# Patient Record
Sex: MALE | Race: WHITE | Employment: FULL TIME | URBAN - METROPOLITAN AREA
[De-identification: names, ages, dates, MRNs, and addresses within clinical notes are randomized per-mention and may not be internally consistent; named-entity substitution may affect disease eponyms.]

---

## 2019-12-13 ENCOUNTER — HOSPITAL ENCOUNTER (OUTPATIENT)
Dept: OCCUPATIONAL THERAPY | Age: 64
Setting detail: THERAPIES SERIES
Discharge: HOME | End: 2019-12-13
Payer: MEDICARE

## 2019-12-13 PROCEDURE — 97165 OT EVAL LOW COMPLEX 30 MIN: CPT | Performed by: OCCUPATIONAL THERAPIST

## 2019-12-13 PROCEDURE — 97110 THERAPEUTIC EXERCISES: CPT | Performed by: OCCUPATIONAL THERAPIST

## 2019-12-13 PROCEDURE — 97018 PARAFFIN BATH THERAPY: CPT | Performed by: OCCUPATIONAL THERAPIST

## 2019-12-17 ENCOUNTER — HOSPITAL ENCOUNTER (OUTPATIENT)
Dept: OCCUPATIONAL THERAPY | Age: 64
Setting detail: THERAPIES SERIES
Discharge: HOME | End: 2019-12-17
Payer: MEDICARE

## 2019-12-17 PROCEDURE — 97110 THERAPEUTIC EXERCISES: CPT | Performed by: OCCUPATIONAL THERAPIST

## 2019-12-17 PROCEDURE — 97140 MANUAL THERAPY 1/> REGIONS: CPT | Performed by: OCCUPATIONAL THERAPIST

## 2019-12-17 PROCEDURE — 97530 THERAPEUTIC ACTIVITIES: CPT | Performed by: OCCUPATIONAL THERAPIST

## 2019-12-19 ENCOUNTER — HOSPITAL ENCOUNTER (OUTPATIENT)
Dept: OCCUPATIONAL THERAPY | Age: 64
Setting detail: THERAPIES SERIES
Discharge: HOME | End: 2019-12-19
Payer: MEDICARE

## 2019-12-19 PROCEDURE — 97530 THERAPEUTIC ACTIVITIES: CPT | Performed by: OCCUPATIONAL THERAPIST

## 2019-12-19 PROCEDURE — 97140 MANUAL THERAPY 1/> REGIONS: CPT | Performed by: OCCUPATIONAL THERAPIST

## 2019-12-19 PROCEDURE — 97110 THERAPEUTIC EXERCISES: CPT | Performed by: OCCUPATIONAL THERAPIST

## 2019-12-19 PROCEDURE — L3925 FO PIP DIP JNT/SPRNG PRE OTS: HCPCS | Performed by: OCCUPATIONAL THERAPIST

## 2019-12-23 ENCOUNTER — HOSPITAL ENCOUNTER (OUTPATIENT)
Dept: OCCUPATIONAL THERAPY | Age: 64
Setting detail: THERAPIES SERIES
Discharge: HOME | End: 2019-12-23
Payer: MEDICARE

## 2019-12-23 PROCEDURE — 97530 THERAPEUTIC ACTIVITIES: CPT | Performed by: OCCUPATIONAL THERAPIST

## 2019-12-23 PROCEDURE — 97018 PARAFFIN BATH THERAPY: CPT | Performed by: OCCUPATIONAL THERAPIST

## 2019-12-23 PROCEDURE — 97110 THERAPEUTIC EXERCISES: CPT | Performed by: OCCUPATIONAL THERAPIST

## 2019-12-23 PROCEDURE — 97140 MANUAL THERAPY 1/> REGIONS: CPT | Performed by: OCCUPATIONAL THERAPIST

## 2019-12-31 ENCOUNTER — HOSPITAL ENCOUNTER (OUTPATIENT)
Dept: OCCUPATIONAL THERAPY | Age: 64
Setting detail: THERAPIES SERIES
Discharge: HOME | End: 2019-12-31
Payer: MEDICARE

## 2019-12-31 PROCEDURE — 97110 THERAPEUTIC EXERCISES: CPT | Performed by: OCCUPATIONAL THERAPIST

## 2019-12-31 PROCEDURE — G0283 ELEC STIM OTHER THAN WOUND: HCPCS | Performed by: OCCUPATIONAL THERAPIST

## 2019-12-31 PROCEDURE — 97140 MANUAL THERAPY 1/> REGIONS: CPT | Performed by: OCCUPATIONAL THERAPIST

## 2019-12-31 PROCEDURE — 97022 WHIRLPOOL THERAPY: CPT | Performed by: OCCUPATIONAL THERAPIST

## 2019-12-31 NOTE — FLOWSHEET NOTE
self care, reaching, carrying, lifting, house/yardwork, driving/computer work.     Therapeutic Activities & NMR:    [] (74643 or 49780) Provided verbal/tactile cueing for activities related to improving balance, coordination, kinesthetic sense, posture, motor skill, proprioception and motor activation to allow for proper function of scapular, scapulothoracic and UE control with self care, carrying, lifting, driving/computer work    Home Exercise Program:    [x] (77260) Reviewed/Progressed HEP activities related to strengthening, flexibility, endurance, ROM of scapular, scapulothoracic and UE control with self care, reaching, carrying, lifting, house/yardwork, driving/computer work  [] (67811) Reviewed/Progressed HEP activities related to improving balance, coordination, kinesthetic sense, posture, motor skill, proprioception of scapular, scapulothoracic and UE control with self care, reaching, carrying, lifting, house/yardwork, driving/computer work      Manual Treatments:  PROM / STM / Oscillations-Mobs:  G-I, II, III, IV (PA's, Inf., Post.)  [x] (67614) Provided manual therapy to mobilize soft tissue/joints of cervical/CT, scapular GHJ and UE for the purpose of modulating pain, promoting relaxation,  increasing ROM, reducing/eliminating soft tissue swelling/inflammation/restriction, improving soft tissue extensibility and allowing for proper ROM for normal function with self care, reaching, carrying, lifting, house/yardwork, driving/computer work    ADL Training:  [] (07741) Provided self-care/home management training related to activities of daily living and compensatory training, and/or use of adaptive equipment        Charges:  Timed Code Treatment Minutes: 35   Total Treatment Minutes: 58        [] EVAL (LOW) 79983 (typically 20 minutes face-to-face)    [] EVAL (MOD) 33229 (typically 30 minutes face-to-face)  [] EVAL (HIGH) 39164 (typically 45 minutes face-to-face)  [] OT Re-eval (83882)       [x] Rickey [phone] x

## 2020-01-07 ENCOUNTER — HOSPITAL ENCOUNTER (OUTPATIENT)
Dept: OCCUPATIONAL THERAPY | Age: 65
Setting detail: THERAPIES SERIES
Discharge: HOME | End: 2020-01-07
Payer: MEDICARE

## 2020-01-07 PROCEDURE — 97022 WHIRLPOOL THERAPY: CPT | Performed by: OCCUPATIONAL THERAPIST

## 2020-01-07 PROCEDURE — 97110 THERAPEUTIC EXERCISES: CPT | Performed by: OCCUPATIONAL THERAPIST

## 2020-01-07 PROCEDURE — 97140 MANUAL THERAPY 1/> REGIONS: CPT | Performed by: OCCUPATIONAL THERAPIST

## 2020-01-07 NOTE — FLOWSHEET NOTE
[facility] [employer]  [facility] [place] [address]  Phone [phone]      Fax [phone]    Occupational Therapy Treatment Note/ Progress Report:     Date:  2020    Patient Name:  Conner Hernandez    :  1955  MRN: [id number]    Medical/Treatment Diagnosis Information:  · Diagnosis: L small Dupuytrens   · Assessment: L hand stiffness  C90.870     Insurance/Certification information:  OT Insurance Information: Medicare/med [place]  Physician Information:  Referring Practitioner: Lior Ren  Has the plan of care been signed (Y/N):        []  Yes  [x]  No       Visit # Insurance Allowable Auth Required   6 Med necessity []  Yes []  No        Is this a Progress Report:     []  Yes  [x]  No      If Yes:  Date Range for reporting period:  Beginning  Ending    Progress report will be due (10 Rx or 30 days whichever is less):     Recertification will be due (POC Duration  / 90 days whichever is less): 20    Date of Injury: NA  Date of Surgery: xiaflex L small 10/25/19 and manipulation 10/28/19    Date of Patient follow up with Physician:     RESTRICTIONS/PRECAUTIONS:     Latex Allergy:  [x]No      []Yes  Pacemaker:  [x] No       [] Yes     Preferred Language for Healthcare:   [x]English       []other:     Functional Scale:   66%  ( Initial 48% )(Quick DASH)   Date assessed:  2019      SUBJECTIVE:   2020 reports he saw Dr. Radha Valdes who told him he had a lot of inflammation. Edema glove recommended ,however he already has one  19  Still unable to make a fist; feels like it it inflammed . \" I think I am going backwards\"  Reports compliance with flex loop. 19  Still stiff and unable to touch palm by end of week.  Reports k tape assisted in resolving ulnar nerve issues  19  States splint feels good at night  Patient reported deficits/history of current problem:   Had manipulation, then went indiv finger flex,  indiv finger ext , abd Yellow  , roll , pinch , indiv finger flex,  indiv finger ext , abd Yellow putty  indiv finger flex  indiv finger ext    and pull         foam cubes placed at base of palm using finger flex     Rubberband exer   finger flex, finger ext, abd              Manual Therapy (65831) Palmar massage K tape for ulnar nerve Place and hold finger ext and IP flex Place and hold finger ext and IP flex    Edema glove issued . Pt felt he could move better with glove in place Place and hold finger ext and IP & composite flex  10X ea    Discussed need to be more consistent with flex loop and to perform exercises following it's usage     Place and hold finger ext and IP flex Gel pad issued for pt to use at night with ext splint to assist with scar remodeling. Pt instructed regarding application , usage , and care   Pt reports gel pad stuck to itself and he could not undo it. Pt to bring in splint and elastomer insert will be fabricated Did not bring in splint today. Neuromuscular Reeducation (79013) Educated pt regarding the ulnar nerve and preventative measures to prevent worsening of symptoms                       ADL Training (26337)                        HEP Training/Review See sheet(s)                       Splinting Pt to bring in splint next visit LMB ext splint  Size B    Flex loop Adjusted PIP ext splint to cont gradually increasing L SF ext      Lcode:        Orthotic Mgmt, Subsequent Enc (82100)        Orthotic Mgmt & Training (08387)                Other:                                          Therapeutic Exercise & NMR:  [x] (65292) Provided verbal/tactile cueing for activities related to strengthening, flexibility, endurance, ROM  for improvements in scapular, scapulothoracic and UE control with self care, reaching, carrying, lifting, house/yardwork, driving/computer work.     [x] (50120) Provided verbal/tactile cueing for activities related to improving balance, coordination, kinesthetic sense, posture, motor skill, proprioception  to assist with  scapular, scapulothoracic and UE control with self care, reaching, carrying, lifting, house/yardwork, driving/computer work.     Therapeutic Activities & NMR:    [] (03835 or 84175) Provided verbal/tactile cueing for activities related to improving balance, coordination, kinesthetic sense, posture, motor skill, proprioception and motor activation to allow for proper function of scapular, scapulothoracic and UE control with self care, carrying, lifting, driving/computer work    Home Exercise Program:    [x] (45750) Reviewed/Progressed HEP activities related to strengthening, flexibility, endurance, ROM of scapular, scapulothoracic and UE control with self care, reaching, carrying, lifting, house/yardwork, driving/computer work  [] (05288) Reviewed/Progressed HEP activities related to improving balance, coordination, kinesthetic sense, posture, motor skill, proprioception of scapular, scapulothoracic and UE control with self care, reaching, carrying, lifting, house/yardwork, driving/computer work      Manual Treatments:  PROM / STM / Oscillations-Mobs:  G-I, II, III, IV (PA's, Inf., Post.)  [x] (76854) Provided manual therapy to mobilize soft tissue/joints of cervical/CT, scapular GHJ and UE for the purpose of modulating pain, promoting relaxation,  increasing ROM, reducing/eliminating soft tissue swelling/inflammation/restriction, improving soft tissue extensibility and allowing for proper ROM for normal function with self care, reaching, carrying, lifting, house/yardwork, driving/computer work    ADL Training:  [] (49503) Provided self-care/home management training related to activities of daily living and compensatory training, and/or use of adaptive equipment        Charges:  Timed Code Treatment Minutes: 45   Total Treatment Minutes: 59        [] GEOVANNA (LOW) 80571 (typically 20 minutes face-to-face)    [] GEOVANNA Hodan Wilkinson  (RN)  2018 12:24:28

## 2020-01-09 ENCOUNTER — HOSPITAL ENCOUNTER (OUTPATIENT)
Dept: OCCUPATIONAL THERAPY | Age: 65
Setting detail: THERAPIES SERIES
Discharge: HOME | End: 2020-01-09
Payer: MEDICARE

## 2020-01-09 PROCEDURE — 97110 THERAPEUTIC EXERCISES: CPT | Performed by: OCCUPATIONAL THERAPIST

## 2020-01-09 PROCEDURE — 97022 WHIRLPOOL THERAPY: CPT | Performed by: OCCUPATIONAL THERAPIST

## 2020-01-09 PROCEDURE — 97140 MANUAL THERAPY 1/> REGIONS: CPT | Performed by: OCCUPATIONAL THERAPIST

## 2020-01-09 NOTE — FLOWSHEET NOTE
[facility] [employer]  [facility] [place] [address]  Phone [phone]      Fax [phone]    Occupational Therapy Treatment Note/ Progress Report:     Date:  2020    Patient Name:  Donovan Woodruff    :  1955  MRN: [id number]    Medical/Treatment Diagnosis Information:  · Diagnosis: L small Dupuytrens   · Assessment: L hand stiffness  D64.351     Insurance/Certification information:  OT Insurance Information: Medicare/med [place]  Physician Information:  Referring Practitioner: Jay Jay Argueta  Has the plan of care been signed (Y/N):        []  Yes  [x]  No       Visit # Insurance Allowable Auth Required   7 Med necessity []  Yes []  No        Is this a Progress Report:     []  Yes  [x]  No      If Yes:  Date Range for reporting period:  Beginning  Ending    Progress report will be due (10 Rx or 30 days whichever is less): 74    Recertification will be due (POC Duration  / 90 days whichever is less): 20    Date of Injury: NA  Date of Surgery: xiaflex L small 10/25/19 and manipulation 10/28/19    Date of Patient follow up with Physician:     RESTRICTIONS/PRECAUTIONS:     Latex Allergy:  [x]No      []Yes  Pacemaker:  [x] No       [] Yes     Preferred Language for Healthcare:   [x]English       []other:     Functional Scale:   66%  ( Initial 48% )(Quick DASH)   Date assessed:  2019      SUBJECTIVE:    20  Reports he feels he is getting better flex  2020 reports he saw Dr. Adam Diaz who told him he had a lot of inflammation. Edema glove recommended ,however he already has one  19  Still unable to make a fist; feels like it it inflammed . \" I think I am going backwards\"  Reports compliance with flex loop. 19  Still stiff and unable to touch palm by end of week.  Reports k tape assisted in resolving ulnar nerve issues  19  States splint feels good at night  Patient reported gradually increasing L SF ext       Lcode:         Orthotic Mgmt, Subsequent Enc (56930)         Orthotic Mgmt & Training (88133)                  Other:                                               Therapeutic Exercise & NMR:  [x] (35164) Provided verbal/tactile cueing for activities related to strengthening, flexibility, endurance, ROM  for improvements in scapular, scapulothoracic and UE control with self care, reaching, carrying, lifting, house/yardwork, driving/computer work. [x] (79927) Provided verbal/tactile cueing for activities related to improving balance, coordination, kinesthetic sense, posture, motor skill, proprioception  to assist with  scapular, scapulothoracic and UE control with self care, reaching, carrying, lifting, house/yardwork, driving/computer work.     Therapeutic Activities & NMR:    [] (17407 or 62875) Provided verbal/tactile cueing for activities related to improving balance, coordination, kinesthetic sense, posture, motor skill, proprioception and motor activation to allow for proper function of scapular, scapulothoracic and UE control with self care, carrying, lifting, driving/computer work    Home Exercise Program:    [x] (54183) Reviewed/Progressed HEP activities related to strengthening, flexibility, endurance, ROM of scapular, scapulothoracic and UE control with self care, reaching, carrying, lifting, house/yardwork, driving/computer work  [] (42912) Reviewed/Progressed HEP activities related to improving balance, coordination, kinesthetic sense, posture, motor skill, proprioception of scapular, scapulothoracic and UE control with self care, reaching, carrying, lifting, house/yardwork, driving/computer work      Manual Treatments:  PROM / STM / Oscillations-Mobs:  G-I, II, III, IV (PA's, Inf., Post.)  [x] (42358) Provided manual therapy to mobilize soft tissue/joints of cervical/CT, scapular GHJ and UE for the purpose of modulating pain, promoting relaxation,  increasing ROM, reducing/eliminating soft tissue swelling/inflammation/restriction, improving soft tissue extensibility and allowing for proper ROM for normal function with self care, reaching, carrying, lifting, house/yardwork, driving/computer work    ADL Training:  [] (93406) Provided self-care/home management training related to activities of daily living and compensatory training, and/or use of adaptive equipment        Charges:  Timed Code Treatment Minutes: 46   Total Treatment Minutes: 60        [] EVAL (LOW) 02389 (typically 20 minutes face-to-face)    [] EVAL (MOD) 39428 (typically 30 minutes face-to-face)  [] EVAL (HIGH) 30873 (typically 45 minutes face-to-face)  [] OT Re-eval (03605)       [x] Rickey [phone]) x  2    [] MPRPX(77902)  [] NMR (39623) x      [] Estim (attended) (40142)   [x] Manual ([id number]) x   1   [] US (92550)  [] TA (74894) x   1   [] Paraffin (38474)  [] ADL  (26103) x     [] Splint/L code:    [] Estim (unattended) (22325)  [x] Fluidotherapy (94151)  [] PKRMI:  LMB & flex loop      ASSESSMENT: Good progress with  strength. Finger flex remains limited but pt feels that is because he is attending therapy earlier in the day. He plans on scheduling later in the day so will cont to monitor and not any difference due to time of day. Frequency/Duration:  1-2 days per week for 4-6 weeks (1/24/20)        GOALS:  Patient stated goal: To be able to lift objects, use tools, and golf   [x]? Progressing: []? Met: []? Not Met: []? Adjusted     Therapist goals for Patient:   Short Term Goals: To be achieved in: 2 weeks  1. Independent in HEP and progression per patient tolerance, in order to prevent re-injury. [x]? Progressing: []? Met: []? Not Met: []? Adjusted   2. Patient will have a decrease in pain to facilitate improvement in movement, function, and ADLs as indicated by Functional Deficits. [x]? Progressing: []? Met: []? Not Met: []?  Adjusted     Long Term Goals to be achieved in 4-6 weeks (through 1/24/20), including patient directed goals to address patient identified performance deficits:  1) Pt to be independent in graded HEP progression with a good level of effort and compliance. [x]? Progressing: []? Met: []? Not Met: []? Adjusted   2) Pt to report a score of </= 25 % on the Quick DASH disability questionnaire for increased performance with carrying, moving, and handling objects. []? Progressing: []? Met: [x]? Not Met: []? Adjusted   3) Pt will demonstrate increased ROM to fingertips to DPFC of L digits </=1 cm and L Small MP and PIP ext </= 15-20 degrees for improved independence with gripping steering wheel,.cutting food, typing, and performing tasks requiring a flat hand  [x]? Progressing: []? Met: []? Not Met: []? Adjusted   4) Pt will demonstrate increased strength to L  within 25# of R  for improved independence with lifting using tools, cooking,cleaning and golf  [x]? Progressing: []? Met: []? Not Met: []? Adjusted   5) Pt will have a decrease in pain to 2/10 to facilitate with gripping and dressing tasks. [x]? Progressing: []? Met: []? Not Met: []? Adjusted         Overall Progression Towards Functional Goals/Treatment Progress Update:  [x] Patient is progressing as expected towards functional goals listed. [] Progression is slowed due to complexities/impairments listed. [] Progression has been slowed due to co-morbidities.   [] Plan just implemented, too soon to assess goals progression <30 days  [] Goals require adjustment due to lack of progress  [] Patient is not progressing as expected and requires additional follow up with physician  [] All goals are met  [] Other:     Prognosis for POC: [x] Good [] Fair  [] Poor    Patient requires continued skilled intervention: [x] Yes  [] No    Treatment/Activity Tolerance:  [x] Patient able to complete treatment  [] Patient limited by fatigue  [] Patient limited by pain    [] Patient limited by other medical complications  [] Other:

## 2020-01-14 ENCOUNTER — HOSPITAL ENCOUNTER (OUTPATIENT)
Dept: OCCUPATIONAL THERAPY | Age: 65
Setting detail: THERAPIES SERIES
Discharge: HOME | End: 2020-01-14
Payer: MEDICARE

## 2020-01-14 PROCEDURE — 97140 MANUAL THERAPY 1/> REGIONS: CPT | Performed by: OCCUPATIONAL THERAPIST

## 2020-01-14 PROCEDURE — 97112 NEUROMUSCULAR REEDUCATION: CPT | Performed by: OCCUPATIONAL THERAPIST

## 2020-01-14 PROCEDURE — 97110 THERAPEUTIC EXERCISES: CPT | Performed by: OCCUPATIONAL THERAPIST

## 2020-01-14 PROCEDURE — 97022 WHIRLPOOL THERAPY: CPT | Performed by: OCCUPATIONAL THERAPIST

## 2020-01-14 NOTE — FLOWSHEET NOTE
gripping I/2 inch dowel Press into putty gripping 1./2 inch dowel     Yellow  , roll , pinch , indiv finger flex,  indiv finger ext , abd Yellow putty  indiv finger flex  indiv finger ext    and pull Yellow putty  indiv finger flex   indiv finger ext   5x ea   and pull  15X yellow putty    and pull  indiv finger flex   indiv finger ext   5x ea         foam cubes placed at base of palm using finger flex Finger ext over foam cube  RF, SF  10xea Med n glides  5x 15 sec hold                   Manual Therapy (89681) Palmar massage Place and hold finger ext and IP flex    Edema glove issued . Pt felt he could move better with glove in place Place and hold finger ext and IP & composite flex  10X ea    Discussed need to be more consistent with flex loop and to perform exercises following it's usage Place and hold finger ext and IP & composite flex  10X ea Place and hold   IP & composite flex  10xea       Pt reports gel pad stuck to itself and he could not undo it. Pt to bring in splint and elastomer insert will be fabricated Did not bring in splint today.              Neuromuscular Reeducation (25014) Educated pt regarding the ulnar nerve and preventative measures to prevent worsening of symptoms    Med n glides  5X                   ADL Training (81925)                        HEP Training/Review See sheet(s)                       Splinting Pt to bring in splint next visit    elastomer insert fabricated and pt to to wear with splint at night to assist with remodeling   Lcode:        Orthotic Mgmt, Subsequent Enc (16174)        Orthotic Mgmt & Training (35978)                Other:                                          Therapeutic Exercise & NMR:  [x] (73694) Provided verbal/tactile cueing for activities related to strengthening, flexibility, endurance, ROM  for improvements in scapular, scapulothoracic and UE control with self care, reaching, carrying, lifting, house/yardwork, driving/computer work.    [x] (13240) Provided verbal/tactile cueing for activities related to improving balance, coordination, kinesthetic sense, posture, motor skill, proprioception  to assist with  scapular, scapulothoracic and UE control with self care, reaching, carrying, lifting, house/yardwork, driving/computer work.     Therapeutic Activities & NMR:    [] (97215 or 41385) Provided verbal/tactile cueing for activities related to improving balance, coordination, kinesthetic sense, posture, motor skill, proprioception and motor activation to allow for proper function of scapular, scapulothoracic and UE control with self care, carrying, lifting, driving/computer work    Home Exercise Program:    [x] (97856) Reviewed/Progressed HEP activities related to strengthening, flexibility, endurance, ROM of scapular, scapulothoracic and UE control with self care, reaching, carrying, lifting, house/yardwork, driving/computer work  [] (53700) Reviewed/Progressed HEP activities related to improving balance, coordination, kinesthetic sense, posture, motor skill, proprioception of scapular, scapulothoracic and UE control with self care, reaching, carrying, lifting, house/yardwork, driving/computer work      Manual Treatments:  PROM / STM / Oscillations-Mobs:  G-I, II, III, IV (PA's, Inf., Post.)  [x] (98972) Provided manual therapy to mobilize soft tissue/joints of cervical/CT, scapular GHJ and UE for the purpose of modulating pain, promoting relaxation,  increasing ROM, reducing/eliminating soft tissue swelling/inflammation/restriction, improving soft tissue extensibility and allowing for proper ROM for normal function with self care, reaching, carrying, lifting, house/yardwork, driving/computer work    ADL Training:  [] (66788) Provided self-care/home management training related to activities of daily living and compensatory training, and/or use of adaptive equipment        Charges:  Timed Code Treatment Minutes: 50   Total Treatment Minutes:

## 2020-01-16 ENCOUNTER — HOSPITAL ENCOUNTER (OUTPATIENT)
Dept: OCCUPATIONAL THERAPY | Age: 65
Setting detail: THERAPIES SERIES
Discharge: HOME | End: 2020-01-16
Payer: MEDICARE

## 2020-01-16 PROCEDURE — 97110 THERAPEUTIC EXERCISES: CPT | Performed by: OCCUPATIONAL THERAPIST

## 2020-01-16 PROCEDURE — 97140 MANUAL THERAPY 1/> REGIONS: CPT | Performed by: OCCUPATIONAL THERAPIST

## 2020-01-16 PROCEDURE — 97022 WHIRLPOOL THERAPY: CPT | Performed by: OCCUPATIONAL THERAPIST

## 2020-01-16 PROCEDURE — 97112 NEUROMUSCULAR REEDUCATION: CPT | Performed by: OCCUPATIONAL THERAPIST

## 2020-01-16 NOTE — FLOWSHEET NOTE
[facility], [facility] [address]  Phone [phone]      Fax [phone]    Occupational Therapy Treatment Note/ Progress Report:     Date:  2020    Patient Name:  Hernandez Velázquez    :  1955  MRN: [id number]    Medical/Treatment Diagnosis Information:  · Diagnosis: L small Dupuytrens   · Assessment: L hand stiffness  H53.134     Insurance/Certification information:  OT Insurance Information: Medicare/med [place]  Physician Information:  Referring Practitioner: Rona Gerber  Has the plan of care been signed (Y/N):        []  Yes  [x]  No       Visit # Insurance Allowable Auth Required   9 Med necessity []  Yes []  No        Is this a Progress Report:     []  Yes  [x]  No      If Yes:  Date Range for reporting period:  Beginning  Ending    Progress report will be due (10 Rx or 30 days whichever is less): 2020       (Notes )    Recertification will be due (POC Duration  / 90 days whichever is less): 20    Date of Injury: NA  Date of Surgery: xiaflex L small 10/25/19 and manipulation 10/28/19    Date of Patient follow up with Physician:     RESTRICTIONS/PRECAUTIONS:     Latex Allergy:  [x]No      []Yes  Pacemaker:  [x] No       [] Yes     Preferred Language for Healthcare:   [x]English       []other:     Functional Scale:   66%  ( Initial 48% )(Quick DASH)   Date assessed:  2019      SUBJECTIVE:  2020 Feels he is  Doing better and is turning corner . Reports night splint is working well . 2020 Still having difficulty bending. 20  Reports he feels he is getting better flex  2020 reports he saw Dr. Segundo Stearns who told him he had a lot of inflammation. Edema glove recommended ,however he already has one  19  Still unable to make a fist; feels like it it inflammed . \" I think I am going backwards\"  Reports compliance with flex loop.   19  Still stiff and AAROM IP flex / hook  1/2 inch dowel  10X  Hook to fist  10x                 powergrip   50 #  20 powergrip   50 #  20        digiflex red  10 x 5 sets ea finger digiflex red ring and small  Gr index and long  10 x 5 sets ea finger         Ext device with med band  15 x2   Therapeutic Activity (55823)  Yellow putty flatten with mallet Press into towel gripping I/2 inch dowel Press into yellow putyy gripping I/2 inch dowel Press into putty gripping 1./2 inch dowel      Yellow  , roll , pinch , indiv finger flex,  indiv finger ext , abd Yellow putty  indiv finger flex  indiv finger ext    and pull Yellow putty  indiv finger flex   indiv finger ext   5x ea   and pull  15X yellow putty    and pull  indiv finger flex   indiv finger ext   5x ea   yellow putty    and pull    Red putty  indiv finger flex   indiv finger ext        foam cubes placed at base of palm using finger flex Finger ext over foam cube  RF, SF  10xea                       Manual Therapy (04885) Palmar massage Place and hold finger ext and IP flex    Edema glove issued . Pt felt he could move better with glove in place Place and hold finger ext and IP & composite flex  10X ea    Discussed need to be more consistent with flex loop and to perform exercises following it's usage Place and hold finger ext and IP & composite flex  10X ea Place and hold   IP & composite flex  10xea Place and hold   IP & composite flex  12xea       Pt reports gel pad stuck to itself and he could not undo it. Pt to bring in splint and elastomer insert will be fabricated Did not bring in splint today.                Neuromuscular Reeducation (78011) Educated pt regarding the ulnar nerve and preventative measures to prevent worsening of symptoms    Med n glides  5X Med n glides  5X                     ADL Training (12330)                           HEP Training/Review See sheet(s)                          Splinting Pt to bring in splint next visit elastomer insert fabricated and pt to to wear with splint at night to assist with remodeling    Lcode:         Orthotic Mgmt, Subsequent Enc (22133)         Orthotic Mgmt & Training (15996)                  Other:                                               Therapeutic Exercise & NMR:  [x] (40508) Provided verbal/tactile cueing for activities related to strengthening, flexibility, endurance, ROM  for improvements in scapular, scapulothoracic and UE control with self care, reaching, carrying, lifting, house/yardwork, driving/computer work. [x] (13618) Provided verbal/tactile cueing for activities related to improving balance, coordination, kinesthetic sense, posture, motor skill, proprioception  to assist with  scapular, scapulothoracic and UE control with self care, reaching, carrying, lifting, house/yardwork, driving/computer work.     Therapeutic Activities & NMR:    [] (03006 or 42752) Provided verbal/tactile cueing for activities related to improving balance, coordination, kinesthetic sense, posture, motor skill, proprioception and motor activation to allow for proper function of scapular, scapulothoracic and UE control with self care, carrying, lifting, driving/computer work    Home Exercise Program:    [x] (59049) Reviewed/Progressed HEP activities related to strengthening, flexibility, endurance, ROM of scapular, scapulothoracic and UE control with self care, reaching, carrying, lifting, house/yardwork, driving/computer work  [] (88695) Reviewed/Progressed HEP activities related to improving balance, coordination, kinesthetic sense, posture, motor skill, proprioception of scapular, scapulothoracic and UE control with self care, reaching, carrying, lifting, house/yardwork, driving/computer work      Manual Treatments:  PROM / STM / Oscillations-Mobs:  G-I, II, III, IV (PA's, Inf., Post.)  [x] (29856) Provided manual therapy to mobilize soft tissue/joints of cervical/CT, scapular GHJ and UE for the purpose of modulating pain, promoting relaxation,  increasing ROM, reducing/eliminating soft tissue swelling/inflammation/restriction, improving soft tissue extensibility and allowing for proper ROM for normal function with self care, reaching, carrying, lifting, house/yardwork, driving/computer work    ADL Training:  [] (32246) Provided self-care/home management training related to activities of daily living and compensatory training, and/or use of adaptive equipment        Charges:  Timed Code Treatment Minutes: 48   Total Treatment Minutes: 60        [] EVAL (LOW) 90834 (typically 20 minutes face-to-face)    [] EVAL (MOD) 34458 (typically 30 minutes face-to-face)  [] EVAL (HIGH) 04499 (typically 45 minutes face-to-face)  [] OT Re-eval (99431)       [x] Rickey [phone]) x  1    [] BDVZM(77158)  [x] NMR (26488) x  1    [] Estim (attended) (11818)   [x] Manual ([id number]) x   1   [] US (20214)  [] TA (55955) x      [] Paraffin (11615)  [] ADL  (83803) x     [] Splint/L code:    [] Estim (unattended) ([phone])  [x] Fluidotherapy (62361)  [] ZVFXO:  LMB & flex loop      ASSESSMENT:  Med nerve glides have helped with tingling per pt report. Able to touch palm with index and long by end of session. Pt very positive today and feels he has \" turned the corner\". Frequency/Duration:  1-2 days per week for 4-6 weeks (1/24/20)        GOALS:  Patient stated goal: To be able to lift objects, use tools, and golf   [x]? Progressing: []? Met: []? Not Met: []? Adjusted     Therapist goals for Patient:   Short Term Goals: To be achieved in: 2 weeks  1. Independent in HEP and progression per patient tolerance, in order to prevent re-injury. [x]? Progressing: []? Met: []? Not Met: []? Adjusted   2. Patient will have a decrease in pain to facilitate improvement in movement, function, and ADLs as indicated by Functional Deficits. [x]? Progressing: []? Met: []? Not Met: []?  Adjusted     Long Term Goals to be achieved in 4-6 weeks (through 1/24/20), including patient directed goals to address patient identified performance deficits:  1) Pt to be independent in graded HEP progression with a good level of effort and compliance. [x]? Progressing: []? Met: []? Not Met: []? Adjusted   2) Pt to report a score of </= 25 % on the Quick DASH disability questionnaire for increased performance with carrying, moving, and handling objects. []? Progressing: []? Met: [x]? Not Met: []? Adjusted   3) Pt will demonstrate increased ROM to fingertips to DPFC of L digits </=1 cm and L Small MP and PIP ext </= 15-20 degrees for improved independence with gripping steering wheel,.cutting food, typing, and performing tasks requiring a flat hand  [x]? Progressing: []? Met: [x]? Not Met: []? Adjusted   4) Pt will demonstrate increased strength to L  within 25# of R  for improved independence with lifting using tools, cooking,cleaning and golf  [x]? Progressing: []? Met: []? Not Met: []? Adjusted   5) Pt will have a decrease in pain to 2/10 to facilitate with gripping and dressing tasks. [x]? Progressing: []? Met: []? Not Met: []? Adjusted         Overall Progression Towards Functional Goals/Treatment Progress Update:  [x] Patient is progressing as expected towards functional goals listed. [] Progression is slowed due to complexities/impairments listed. [] Progression has been slowed due to co-morbidities.   [] Plan just implemented, too soon to assess goals progression <30 days  [] Goals require adjustment due to lack of progress  [] Patient is not progressing as expected and requires additional follow up with physician  [] All goals are met  [] Other:     Prognosis for POC: [x] Good [] Fair  [] Poor    Patient requires continued skilled intervention: [x] Yes  [] No    Treatment/Activity Tolerance:  [x] Patient able to complete treatment  [] Patient limited by fatigue  [] Patient limited by pain    [] Patient limited by other medical complications  [] Other: PLAN: Gradually  progress AROM, function and strength  [x] Continue per plan of care [x] Alter current plan Pt to be out of town for a week . Will resume when he returns. [] Plan of care initiated [] Hold pending MD visit [] Discharge      Electronically signed by: Annita Ayala OTR/L CHT  OT [id number]    Note: If patient does not return for scheduled/ recommended follow up visits, this note will serve as a discharge from care along with most recent update on progress.